# Patient Record
Sex: FEMALE | Race: AMERICAN INDIAN OR ALASKA NATIVE
[De-identification: names, ages, dates, MRNs, and addresses within clinical notes are randomized per-mention and may not be internally consistent; named-entity substitution may affect disease eponyms.]

---

## 2020-01-14 ENCOUNTER — HOSPITAL ENCOUNTER (OUTPATIENT)
Dept: HOSPITAL 5 - ED | Age: 55
Setting detail: OBSERVATION
LOS: 1 days | Discharge: HOME | End: 2020-01-15
Attending: INTERNAL MEDICINE | Admitting: INTERNAL MEDICINE
Payer: MEDICARE

## 2020-01-14 DIAGNOSIS — I25.10: ICD-10-CM

## 2020-01-14 DIAGNOSIS — J44.9: ICD-10-CM

## 2020-01-14 DIAGNOSIS — R07.89: Primary | ICD-10-CM

## 2020-01-14 DIAGNOSIS — Z88.0: ICD-10-CM

## 2020-01-14 DIAGNOSIS — Z88.8: ICD-10-CM

## 2020-01-14 DIAGNOSIS — Z90.710: ICD-10-CM

## 2020-01-14 DIAGNOSIS — Z79.82: ICD-10-CM

## 2020-01-14 DIAGNOSIS — Z79.51: ICD-10-CM

## 2020-01-14 DIAGNOSIS — Z87.891: ICD-10-CM

## 2020-01-14 DIAGNOSIS — I25.2: ICD-10-CM

## 2020-01-14 DIAGNOSIS — Z79.899: ICD-10-CM

## 2020-01-14 DIAGNOSIS — I10: ICD-10-CM

## 2020-01-14 DIAGNOSIS — Z88.1: ICD-10-CM

## 2020-01-14 DIAGNOSIS — Z91.018: ICD-10-CM

## 2020-01-14 LAB
BASOPHILS # (AUTO): 0.1 K/MM3 (ref 0–0.1)
BASOPHILS NFR BLD AUTO: 0.8 % (ref 0–1.8)
BUN SERPL-MCNC: 11 MG/DL (ref 7–17)
BUN/CREAT SERPL: 14 %
CALCIUM SERPL-MCNC: 9.6 MG/DL (ref 8.4–10.2)
EOSINOPHIL # BLD AUTO: 0.1 K/MM3 (ref 0–0.4)
EOSINOPHIL NFR BLD AUTO: 1.1 % (ref 0–4.3)
HCT VFR BLD CALC: 37.6 % (ref 30.3–42.9)
HEMOLYSIS INDEX: 41
HGB BLD-MCNC: 11.9 GM/DL (ref 10.1–14.3)
LYMPHOCYTES # BLD AUTO: 2.5 K/MM3 (ref 1.2–5.4)
LYMPHOCYTES NFR BLD AUTO: 26.8 % (ref 13.4–35)
MCHC RBC AUTO-ENTMCNC: 32 % (ref 30–34)
MCV RBC AUTO: 89 FL (ref 79–97)
MONOCYTES # (AUTO): 0.6 K/MM3 (ref 0–0.8)
MONOCYTES % (AUTO): 6.5 % (ref 0–7.3)
PLATELET # BLD: 384 K/MM3 (ref 140–440)
RBC # BLD AUTO: 4.2 M/MM3 (ref 3.65–5.03)

## 2020-01-14 PROCEDURE — 96375 TX/PRO/DX INJ NEW DRUG ADDON: CPT

## 2020-01-14 PROCEDURE — A9502 TC99M TETROFOSMIN: HCPCS

## 2020-01-14 PROCEDURE — 96374 THER/PROPH/DIAG INJ IV PUSH: CPT

## 2020-01-14 PROCEDURE — 80048 BASIC METABOLIC PNL TOTAL CA: CPT

## 2020-01-14 PROCEDURE — G0378 HOSPITAL OBSERVATION PER HR: HCPCS

## 2020-01-14 PROCEDURE — 82553 CREATINE MB FRACTION: CPT

## 2020-01-14 PROCEDURE — 71045 X-RAY EXAM CHEST 1 VIEW: CPT

## 2020-01-14 PROCEDURE — 85025 COMPLETE CBC W/AUTO DIFF WBC: CPT

## 2020-01-14 PROCEDURE — 93010 ELECTROCARDIOGRAM REPORT: CPT

## 2020-01-14 PROCEDURE — 78452 HT MUSCLE IMAGE SPECT MULT: CPT

## 2020-01-14 PROCEDURE — 93017 CV STRESS TEST TRACING ONLY: CPT

## 2020-01-14 PROCEDURE — 99284 EMERGENCY DEPT VISIT MOD MDM: CPT

## 2020-01-14 PROCEDURE — 82550 ASSAY OF CK (CPK): CPT

## 2020-01-14 PROCEDURE — 93005 ELECTROCARDIOGRAM TRACING: CPT

## 2020-01-14 PROCEDURE — 84484 ASSAY OF TROPONIN QUANT: CPT

## 2020-01-14 PROCEDURE — 36415 COLL VENOUS BLD VENIPUNCTURE: CPT

## 2020-01-14 NOTE — XRAY REPORT
CHEST 1 VIEW 



INDICATION / CLINICAL INFORMATION:

Chest pain.



COMPARISON: 

8/11/14



FINDINGS:



SUPPORT DEVICES: None.

HEART / MEDIASTINUM: No significant abnormality. 

LUNGS / PLEURA: No significant pulmonary or pleural abnormality..  No pneumothorax. 



ADDITIONAL FINDINGS: No significant additional findings.



IMPRESSION:

1. No acute findings.



Signer Name: Steve Allred MD 

Signed: 1/14/2020 7:29 PM

 Workstation Name: [id number]

## 2020-01-15 VITALS — SYSTOLIC BLOOD PRESSURE: 112 MMHG | DIASTOLIC BLOOD PRESSURE: 76 MMHG

## 2020-01-15 LAB
BASOPHILS # (AUTO): 0 K/MM3 (ref 0–0.1)
BASOPHILS NFR BLD AUTO: 0.2 % (ref 0–1.8)
BUN SERPL-MCNC: 13 MG/DL (ref 7–17)
BUN/CREAT SERPL: 16 %
CALCIUM SERPL-MCNC: 8.8 MG/DL (ref 8.4–10.2)
EOSINOPHIL # BLD AUTO: 0.1 K/MM3 (ref 0–0.4)
EOSINOPHIL NFR BLD AUTO: 1.1 % (ref 0–4.3)
HCT VFR BLD CALC: 35.4 % (ref 30.3–42.9)
HEMOLYSIS INDEX: 0
HGB BLD-MCNC: 11.9 GM/DL (ref 10.1–14.3)
LYMPHOCYTES # BLD AUTO: 2.3 K/MM3 (ref 1.2–5.4)
LYMPHOCYTES NFR BLD AUTO: 36 % (ref 13.4–35)
MCHC RBC AUTO-ENTMCNC: 34 % (ref 30–34)
MCV RBC AUTO: 88 FL (ref 79–97)
MONOCYTES # (AUTO): 0.5 K/MM3 (ref 0–0.8)
MONOCYTES % (AUTO): 7.7 % (ref 0–7.3)
PLATELET # BLD: 363 K/MM3 (ref 140–440)
RBC # BLD AUTO: 4.01 M/MM3 (ref 3.65–5.03)

## 2020-01-15 RX ADMIN — OXYCODONE AND ACETAMINOPHEN PRN TAB: 5; 325 TABLET ORAL at 12:40

## 2020-01-15 RX ADMIN — OXYCODONE AND ACETAMINOPHEN PRN TAB: 5; 325 TABLET ORAL at 00:29

## 2020-01-15 NOTE — TREADMILL REPORT
NUCLEAR STRESS TEST



The patient is brought to the Cardiology lab and a nuclear stress test is

performed by administering dobutamine by protocol.  Post-stress images reveal

homogeneous distribution of the isotope with no significant reversible defects

to indicate ischemia.  Accompanying gated study shows excellent left ventricular

systolic function with ejection fraction of 80%.



IMPRESSION:

1.  This dobutamine stress test is noted to be negative for reversible defects

to indicate ischemia.

2.  Excellent systolic function with ejection fraction of 80%.

3.  Suggest clinical correlation.





DD: 01/15/2020 11:07

DT: 01/15/2020 21:17

JOB# [id number]  [id number]

[facility]

## 2020-01-15 NOTE — DISCHARGE SUMMARY
Providers





- Providers


Date of Admission: 


01/14/20 22:15





Date of discharge: 01/15/20


Attending physician: 


KAMRAN CHAPMAN





Primary care physician: 


[facility]IMARY CARE MD








Hospitalization


Condition: Stable


Hospital course: 


Patient is a 58-year-old woman with a history of hypertension, COPD, irritable 

bowel syndrome, coronary artery disease comes emergency room with complaints of 

chest pain for more than 2 days ago.  Patient states she has a history of 

coronary artery disease, diagnosed in 2014, no intervention was done after the 

stress test, she was placed on statin and aspirin. Pain is in the epigastric 

area which she described as a elephant sitting on her chest, constant, intensity

5/10,  radiating to the left arm, worse with movement, worse with morphine given

in the emergency room.  She stated that today she got dizzy with chest pain.  

Admits to nausea, shortness of breath, no diaphoresis, palpitation





Chest pain, costochrondritis stable


Check cardiac enzymes, check stress test


start aspirin, percocet


Continue outpatient medication





Hypertension


Continue outpatient medications





COPD, stable





DVT prophylaxis





Disposition: DC-01 TO HOME OR SELFCARE


Time spent for discharge: 31 minutes





Core Measure Documentation





- Palliative Care


Palliative Care/ Comfort Measures: Not Applicable





- Core Measures


Any of the following diagnoses?: none





- VTE Discharge Requirements


Deep Vein Thrombosis/Pulmonary Embolism Present on Admission: No


Has pt received <5 days of overlap therapy or INR<2.0: No


Anticoagulant overlap therapy prescribed at discharge: No


Contraindication No Overlap Therapy order at DC: Not Indicated





Exam





- Physical Exam


Narrative exam: 


GEN: WDWN, NAD, Awake, Alert, Orientated 


HEENT: NCAT, EOMI, PERRL, OP Clear 


NECK: supple, no adenopathy, no thyromegaly, no JVD 


CVS/HEART: RRR, normal S1S2, pulses present bilaterally 


CHEST/LUNGS: CTA B, Symmetrical chest expansion, good air entry bilaterally, 

reproducible CW tenderness


GI/Abdomen: soft, NTND, good bowel sounds, no guarding or rebound 


/Bladder: no suprapubic tenderness, no CVA or paraspinal tenderness 


EXT/Skin: no c/c/e, no obvious rash 


MSK: FROM x 4 


Neuro: CN 2-12 grossly intact, no new focal deficits 


Psych: calm 








- Constitutional


Vitals: 


                                        











Temp Pulse Resp BP Pulse Ox


 


 98.0 F   52 L  20   95/64   100 


 


 01/15/20 04:54  01/15/20 08:06  01/15/20 12:40  01/15/20 04:54  01/15/20 04:54














Plan


Activity: other (no strenous activity unless cleared by PCP)


Diet: low salt


Follow up with: 


[facility]IMARY [facility]ARE,MD [Primary Care Provider] - 7 Days


[facility] [Other[facility] - 7 Days


Prescriptions: 


oxyCODONE /ACETAMINOPHEN [Percocet 5/325 mg] 1 tab PO Q6H PRN #15 tablet


 PRN Reason: Pain, Moderate (4-6)

## 2020-01-20 ENCOUNTER — HOSPITAL ENCOUNTER (EMERGENCY)
Dept: HOSPITAL 5 - ED | Age: 55
Discharge: HOME | End: 2020-01-20
Payer: MEDICARE

## 2020-01-20 VITALS — DIASTOLIC BLOOD PRESSURE: 86 MMHG | SYSTOLIC BLOOD PRESSURE: 123 MMHG

## 2020-01-20 DIAGNOSIS — R39.11: ICD-10-CM

## 2020-01-20 DIAGNOSIS — Z88.0: ICD-10-CM

## 2020-01-20 DIAGNOSIS — R07.89: Primary | ICD-10-CM

## 2020-01-20 DIAGNOSIS — I10: ICD-10-CM

## 2020-01-20 DIAGNOSIS — I25.2: ICD-10-CM

## 2020-01-20 DIAGNOSIS — Z90.710: ICD-10-CM

## 2020-01-20 DIAGNOSIS — Z79.899: ICD-10-CM

## 2020-01-20 DIAGNOSIS — Z87.19: ICD-10-CM

## 2020-01-20 DIAGNOSIS — Z90.49: ICD-10-CM

## 2020-01-20 DIAGNOSIS — J44.9: ICD-10-CM

## 2020-01-20 DIAGNOSIS — Z88.8: ICD-10-CM

## 2020-01-20 DIAGNOSIS — R91.1: ICD-10-CM

## 2020-01-20 DIAGNOSIS — K58.9: ICD-10-CM

## 2020-01-20 LAB
ALBUMIN SERPL-MCNC: 4 G/DL (ref 3.9–5)
ALT SERPL-CCNC: 139 UNITS/L (ref 7–56)
BUN SERPL-MCNC: 8 MG/DL (ref 7–17)
BUN/CREAT SERPL: 10 %
CALCIUM SERPL-MCNC: 10.5 MG/DL (ref 8.4–10.2)
HCT VFR BLD CALC: 39.3 % (ref 30.3–42.9)
HEMOLYSIS INDEX: 11
HGB BLD-MCNC: 13 GM/DL (ref 10.1–14.3)
INR PPP: 1.01 (ref 0.87–1.13)
MCHC RBC AUTO-ENTMCNC: 33 % (ref 30–34)
MCV RBC AUTO: 87 FL (ref 79–97)
PLATELET # BLD: 426 K/MM3 (ref 140–440)
RBC # BLD AUTO: 4.5 M/MM3 (ref 3.65–5.03)

## 2020-01-20 PROCEDURE — 74022 RADEX COMPL AQT ABD SERIES: CPT

## 2020-01-20 PROCEDURE — 80053 COMPREHEN METABOLIC PANEL: CPT

## 2020-01-20 PROCEDURE — 93010 ELECTROCARDIOGRAM REPORT: CPT

## 2020-01-20 PROCEDURE — 36415 COLL VENOUS BLD VENIPUNCTURE: CPT

## 2020-01-20 PROCEDURE — 85027 COMPLETE CBC AUTOMATED: CPT

## 2020-01-20 PROCEDURE — 85379 FIBRIN DEGRADATION QUANT: CPT

## 2020-01-20 PROCEDURE — 99285 EMERGENCY DEPT VISIT HI MDM: CPT

## 2020-01-20 PROCEDURE — 83735 ASSAY OF MAGNESIUM: CPT

## 2020-01-20 PROCEDURE — 84484 ASSAY OF TROPONIN QUANT: CPT

## 2020-01-20 PROCEDURE — 85610 PROTHROMBIN TIME: CPT

## 2020-01-20 PROCEDURE — 74177 CT ABD & PELVIS W/CONTRAST: CPT

## 2020-01-20 PROCEDURE — 93005 ELECTROCARDIOGRAM TRACING: CPT

## 2020-01-20 PROCEDURE — 71275 CT ANGIOGRAPHY CHEST: CPT

## 2020-01-20 NOTE — EMERGENCY DEPARTMENT REPORT
<RYLEE VAUGHN - Last Filed: 20 14:12>





ED General Adult HPI





- General


Chief complaint: Abdominal Pain


Stated complaint: LOWER ABD PAIN/RECTUM PAIN


Time Seen by Provider: 20 11:59


Source: patient, EMS (EMS records not available at this time for chart dictation

or review.), RN notes reviewed, old records reviewed


Mode of arrival: Stretcher


Limitations: No Limitations





- History of Present Illness


Initial comments: 





During the entire history and physical examination, I am chaperone and escorted 

by  nurse Brittney Gaspar








Primary care DrBuddy: Khris





Past medical history: Hypertension, COPD, IBS, question CAD, had a negative 

nuclear stress test this month











Patient is a 54-year-old female who was not known to myself previously.  She 

presents with multiple complaints today.  She presents with central and left-s

ided chest wall pain and breast pain, present intermittently for over a week.  

The pain does not radiate to the back, arms or neck.  There is no vomiting or 

diaphoresis or exertional shortness of breath.





The patient indicates the pain worsens when she gets up and decreases with rest.





The patient's next complaint is urinary hesitancy, straining to urinate, and 

feeling like she has a mass in her vagina that's preventing her from urinating. 

She also endorses brown stool mixed with red blood, for one day, straining to 

defecate.  She denies dysuria and hematuria.  She is not taking any blood thi

nning medicines.








There is no complaint of headache, neck pain, she has no upper abdominal pain, 

no muscular skeletal extremity complaints are endorses


-: Gradual


Location: chest


Severity scale (0 -10): 8


Quality: aching


Consistency: other


Improves with: other


Worsens with: other


Associated Symptoms: other





- Related Data


                                Home Medications











 Medication  Instructions  Recorded  Confirmed  Last Taken


 


ALBUTEROL NEB's [Proventil 0.083% 2.5 mg IH QID 20 Unknown





NEBS]    


 


Albuterol Sulfate [Proair 90 mcg IH QID MDD 2 PUFFS 20 Unknown





Digihaler]    


 


Aspirin EC [Halfprin EC] 81 mg PO QDAY 20 Unknown


 


Budesonide/Formoterol Fumarate 10.2 gm IH BID MDD 2 PUFFS 20 

Unknown





[Symbicort 160-4.5 Mcg Inhaler]    


 


Dicyclomine [Bentyl] 10 mg PO BID MDD 2 CAPSULES 20 Unknown


 


EPINEPHrine [Epipen] 0.3 mg IJ PRN 01/14/20 01/15/20 Unknown


 


Fluticasone [Flonase] 2 spray NS BID 20 Unknown


 


Ipratropium [Atrovent NEB] 0.5 mg IH Q4HR 20 Unknown


 


Loratadine [Claritin] 10 mg PO QDAY 20 Unknown


 


Rosuvastatin Calcium [Crestor] 20 mg PO QDAY 20 Unknown


 


Sennosides [Senna] 8.6 mg PO QHS MDD 2 TABLETS 20 Unknown


 


Tiotropium Bromide [Spiriva] 18 mcg IH QDAY MDD 2 20 Unknown


 


Triamcinolone 0.1% [Kenalog 0.1% 1 applic TP BID 20 Unknown





CREAM]    


 


carvediloL [Coreg] 3.125 mg PO BID 20 Unknown








                                  Previous Rx's











 Medication  Instructions  Recorded  Last Taken  Type


 


Acetaminophen [Acetaminophen TAB] 2 tab PO Q4H PRN #15 tablet 01/15/20 Unknown 

Rx


 


oxyCODONE /ACETAMINOPHEN [Percocet 1 tab PO Q6H PRN #15 tablet 01/15/20 Unknown 

Rx





5/325 mg]    











                                    Allergies











Allergy/AdvReac Type Severity Reaction Status Date / Time


 


amlodipine [From Norvasc] Allergy  Unknown Verified 20 19:14


 


clarithromycin [From Biaxin] Allergy  Rash Verified 13 10:45


 


clonidine Allergy  Unknown Verified 20 19:14


 


grape Allergy  Unknown Verified 01/15/20 03:24


 


hydralazine [Hydralazine] Allergy  Unknown Verified 13 10:45


 


hydrochlorothiazide Allergy  Unknown Verified 20 19:14


 


lisinopril Allergy  Unknown Verified 20 19:14


 


losartan Allergy  Unknown Verified 20 19:14


 


Penicillins Allergy  Unknown Verified 13 10:45


 


regadenoson [From Lexiscan] Allergy  Unknown Verified 01/15/20 03:00


 


apple Allergy  Unknown Uncoded 01/15/20 03:24


 


peach Allergy  Unknown Uncoded 01/15/20 03:24


 


strawberry Allergy  Unknown Uncoded 01/15/20 03:24














ED Review of Systems


Constitutional: denies: fever


Eyes: denies: eye discharge


ENT: denies: congestion


Respiratory: denies: wheezing


Cardiovascular: chest pain


Gastrointestinal: hematochezia


Genitourinary: other


Musculoskeletal: denies: back pain


Skin: denies: lesions


Psychiatric: anxiety


Hematological/Lymphatic: denies: easy bleeding





ED Past Medical Hx





- Past Medical History


Hx Hypertension: Yes


Hx Heart Attack/AMI: Yes (MI in )


Hx Congestive Heart Failure: No


Hx Diabetes: No


Hx Deep Vein Thrombosis: No


Hx Pulmonary Embolism: No


Hx Asthma: No


Hx COPD: Yes


Hx Tuberculosis: No


Additional medical history: Irritable Bowel syndrome





- Surgical History


Hx Coronary Stent: No


Hx Pacemaker: No


Hx Internal Defibrillator: No


Hx Cholecystectomy: Yes


Additional Surgical History: HYSTERECTOMY





- Social History


Smoking Status: Never Smoker


Substance Use Type: None





- Medications


Home Medications: 


                                Home Medications











 Medication  Instructions  Recorded  Confirmed  Last Taken  Type


 


ALBUTEROL NEB's [Proventil 0.083% 2.5 mg IH QID 20 Unknown 

History





NEBS]     


 


Albuterol Sulfate [Proair 90 mcg IH QID MDD 2 PUFFS 20 Unknown 

History





Digihaler]     


 


Aspirin EC [Halfprin EC] 81 mg PO QDAY 20 Unknown History


 


Budesonide/Formoterol Fumarate 10.2 gm IH BID MDD 2 PUFFS 20 

Unknown History





[Symbicort 160-4.5 Mcg Inhaler]     


 


Dicyclomine [Bentyl] 10 mg PO BID MDD 2 CAPSULES 20 Unknown 

History


 


EPINEPHrine [Epipen] 0.3 mg IJ PRN 01/14/20 01/15/20 Unknown History


 


Fluticasone [Flonase] 2 spray NS BID 20 Unknown History


 


Ipratropium [Atrovent NEB] 0.5 mg IH Q4HR 20 Unknown History


 


Loratadine [Claritin] 10 mg PO QDAY 20 Unknown History


 


Rosuvastatin Calcium [Crestor] 20 mg PO QDAY 20 Unknown History


 


Sennosides [Senna] 8.6 mg PO QHS MDD 2 TABLETS 20 Unknown History


 


Tiotropium Bromide [Spiriva] 18 mcg IH QDAY MDD 2 20 Unknown 

History


 


Triamcinolone 0.1% [Kenalog 0.1% 1 applic TP BID 20 Unknown 

History





CREAM]     


 


carvediloL [Coreg] 3.125 mg PO BID 20 Unknown History


 


Acetaminophen [Acetaminophen TAB] 2 tab PO Q4H PRN #15 tablet 01/15/20  Unknown 

Rx


 


oxyCODONE /ACETAMINOPHEN [Percocet 1 tab PO Q6H PRN #15 tablet 01/15/20  Unknown

 Rx





5/325 mg]     














ED Physical Exam





- General


Limitations: No Limitations


General appearance: alert, anxious





- Head


Head exam: Present: atraumatic, normocephalic





- Eye


Eye exam: Present: normal appearance, EOMI.  Absent: nystagmus





- ENT


ENT exam: Present: normal exam, normal orophraynx, mucous membranes moist, 

normal external ear exam





- Neck


Neck exam: Present: normal inspection, full ROM.  Absent: tenderness, 

meningismus





- Respiratory


Respiratory exam: Present: normal lung sounds bilaterally, chest wall 

tenderness, other (there is no breast redness, pus, tenderness or streaking.  

There is reproducible central chest wall pain and left-sided breast pain.  No 

breast masses are appreciated.  Chaperoned by nurse brittney mittal).  Absent:

respiratory distress, wheezes, rales, rhonchi, stridor





- Cardiovascular


Cardiovascular Exam: Present: regular rate, normal rhythm, normal heart sounds. 

Absent: bradycardia, tachycardia, irregular rhythm, systolic murmur, diastolic 

murmur, rubs, gallop





- GI/Abdominal


GI/Abdominal exam: Present: soft.  Absent: distended, tenderness, guarding, re

bound, rigid, pulsatile mass





- Rectal


Rectal exam: Present: normal inspection, hemorrhoids, other (external 

hemorrhoids noted.  No obvious bleeding noted.  Chaperoned by nurse Brittney Gaspar).  Absent: tenderness





- 


External exam: Present: normal external exam.  Absent: bleeding


Speculum exam: Present: tissue (nontender tissue noted anteriorly, around the 

urethra, suspicious for urethral  prolapse).  Absent: vaginal bleeding





- Extremities Exam


Extremities exam: Present: normal inspection, full ROM, other (2+ pulses noted 

in the bilateral upper and lower extremities.  There is no long bony tenderness.

 The muscular compartments are soft.  The pelvis is stable.).  Absent: pedal 

edema, calf tenderness





- Back Exam


Back exam: Present: normal inspection, full ROM.  Absent: paraspinal tenderness,

vertebral tenderness





- Neurological Exam


Neurological exam: Present: alert, other (2+ pulses noted in the bilateral upper

and lower extremities.  There is no long bony tenderness.  The muscular 

compartments are soft.  The pelvis is stable.).  Absent: motor sensory deficit





- Psychiatric


Psychiatric exam: Present: anxious





- Skin


Skin exam: Present: warm, dry, intact, normal color.  Absent: rash





ED Course





- Reevaluation(s)


Reevaluation #1: 





20 13:30


Differential diagnosis, including but not limited to: GERD, gastritis, co

stochondritis, pneumonia, pulmonary embolism, acute coronary syndrome, 

constipation, internal hemorrhoids, external hemorrhoids, urethral prolapse, 

pelvic floor dysfunction





Assessment and plan: 54-year-old female with multiple complaints





Complaints #1, chest pain, reproducible, had a negative nuclear stress test this

month, EKG unchanged from prior, not tachycardic, tachypneic or hypoxic.  Patie

nt unlikely to experience major adverse cardiac event, she is low risk by the 

heart score.  Given persistence of pain, recent hospitalization, d-dimer is 

ordered, found to be elevated, CT scan of the chest is ordered.  However, the 

patient is not tachycardic, tachypneic or hypoxic, therefore, a pulmonary 

embolism is unlikely.  We will treat her pain supportively and symptomatically.











Complaints #2, constipation, brown stool with red blood.  No blood noted on 

rectal examination.  Hemoglobin, hematocrit unremarkable.  Question external 

hemorrhoid.  Complaints #3, urinary hesitancy, possible urethral prolapse on 

examination.  Treat supportively and symptomatically, obtain imaging studies to 

exclude acute surgical process, reassess.


Reevaluation #2: 





20 14:02


Laboratory studies reviewed and appreciated.  Patient is status post 

cholecystectomy.  There is no right upper quadrant pain or tenderness.  She can 

follow up with an outpatient primary care doctor for nonspecific elevation in 

LFTs.  Resting comfortably and in no acute distress at this time


Reevaluation #3: 





20 14:14


care will be transferred to Dr FRANKLIN Jaime to follow up on repeat ekg, ct angio ct 

abd/pelvis

















ED Medical Decision Making





- Lab Data


Result diagrams: 


                                 20 12:30





                                 20 12:30








                                   Vital Signs











  20





  11:54 11:56


 


Temperature 97.9 F 97.9 F


 


Pulse Rate 60 60


 


Respiratory 20 20





Rate  


 


Blood Pressure  132/85


 


Blood Pressure 132/85 





[Left]  


 


O2 Sat by Pulse 100 100





Oximetry  











                                   Lab Results











  20 Range/Units





  12:30 12:30 12:30 


 


WBC  8.9    (4.5-11.0)  K/mm3


 


RBC  4.50    (3.65-5.03)  M/mm3


 


Hgb  13.0    (10.1-14.3)  gm/dl


 


Hct  39.3    (30.3-42.9)  %


 


MCV  87    (79-97)  fl


 


MCH  29    (28-32)  pg


 


MCHC  33    (30-34)  %


 


RDW  16.0 H    (13.2-15.2)  %


 


Plt Count  426    (140-440)  K/mm3


 


PT   13.4   (12.2-14.9)  Sec.


 


INR   1.01   (0.87-1.13)  


 


D-Dimer   714.66 H   (0-234)  ng/mlDDU


 


Sodium    144  (137-145)  mmol/L


 


Potassium    4.0  (3.6-5.0)  mmol/L


 


Chloride    107.1 H  ()  mmol/L


 


Carbon Dioxide    22  (22-30)  mmol/L


 


Anion Gap    19  mmol/L


 


BUN    8  (7-17)  mg/dL


 


Creatinine    0.8  (0.7-1.2)  mg/dL


 


Estimated GFR    > 60  ml/min


 


BUN/Creatinine Ratio    10  %


 


Glucose    110 H  ()  mg/dL


 


Calcium    10.5 H  (8.4-10.2)  mg/dL


 


Magnesium    2.00  (1.7-2.3)  mg/dL


 


Total Bilirubin    0.30  (0.1-1.2)  mg/dL


 


AST    41 H  (5-40)  units/L


 


ALT    139 H  (7-56)  units/L


 


Alkaline Phosphatase    117  ()  units/L


 


Troponin T    < 0.010  (0.00-0.029)  ng/mL


 


Total Protein    7.6  (6.3-8.2)  g/dL


 


Albumin    4.0  (3.9-5)  g/dL


 


Albumin/Globulin Ratio    1.1  %














- EKG Data


-: EKG Interpreted by Me





- Radiology Data


Radiology results: report reviewed, image reviewed


interpreted by me: 





X-ray the chest is negative for acute disease, mediastinum appears to be 

exaggerated secondary to portable technique





X-ray of the abdomen and pelvis shows no acute bony disease, constipation, 

nonspecific bowel gas pattern, pelvic phleboliths noted





int Report


Referring Physician:   RYLEE VAUGHN


Patient Name:   KERRI ODELL


Patient ID:   [id number]


YOB: 1965


Sex:   Female


Accession:   [id number]


Report Date:   2020


Report Status:   Finalized


Findings


[facility]


[address] [place] [address]





XRay Report


Signed





Patient: KERRI ODELL MR#: M


[id number]


: 1965 Acct:[id number]





Age/Sex: 54 / F ADM Date: 20





Loc: ED


Attending Dr:








Ordering Physician: RYLEE VAUGHN MD


Date of Service: 20


Procedure(s): XR abd series w cxr 1V


Accession Number(s): [id number]





cc: RYLEE VAUGHN MD





Fluoro Time In Minutes:





PROCEDURE: ABDOMEN FLAT AND UPRIGHT WITH SINGLE VIEW CHEST





HISTORY: cp abd pressure





COMPARISON: None.





TECHNIQUE: Supine and upright abdominal as well as single view chest radiographs

 obtained.





FINDINGS:


Lungs: The lungs are clear. The lung volumes are normal.


Pleural Effusion: No evidence of a pleural effusion is seen.


Pneumothorax: No evidence of pneumothorax is seen.


Cardiac: The heart size is normal.


Mediastinal silhouette: The mediastinal silhouette is normal.


Hilar regions: The hilar regions are normal in appearance.


Pulmonary vascularity: The pulmonary vascularity is normal in appearance.


Trachea: The trachea is midline.


Skeletal structures: No suspicious bone lesion. Support hardware: None.





Additional findings: Right upper quadrant surgical clips.





Bowel: The bowel gas pattern is normal in appearance. No evidence of obstruction

 is seen. No


evidence of free air is identified.


Calcifications: No abnormal calcifications over the kidneys or along the course 

of the ureters are


seen. No phleboliths in the pelvis are seen.


Osseous Structures: No suspicious bone lesion.





Additional findings: Phleboliths in the pelvis.





IMPRESSION: Negative abdomen and chest exams. Status post cholecystectomy.





Signer Name: Rah Abrams MD


Signed: 2020 1:35 PM


Workstation Name: [id number]








Transcribed By: REF


Dictated By: RAH ABRAMS MD


Electronically Authenticated By: RAH ABRAMS MD


Signed Date/Time: 20











DD/DT: 20 1333





ED Disposition


Clinical Impression: 


 Chest wall pain, Urinary hesitancy, History of gastrointestinal bleeding, 

Incidental lung nodule, > 3mm and < 8mm





Disposition: - TO HOME OR SELFCARE


Condition: Stable


Instructions:  Chest Pain (ED), Abdominal Pain (ED), Pulmonary Nodules (ED)


Additional Instructions: 


Patient may continue outpatient medications.  Rest, avoid heavy lifting, and 

avoid strenuous physical activities.  If patient takes metformin medication, she

 should not take this medication for the next few days.





Avoid consumption of heavy and/or spicy foods.  Take Motrin, ibuprofen as needed

 for pain.  Recommend that patient drinks 4-6 cups of water per day for the 

foreseeable future, and eat plenty of fiber, fruits, vegetables.





Recommend patient follow up with the primary care doctor for chest wall pain or 

a cardiologist for chest wall pain within the next 7 days.





Recommend patient follow-up with an outpatient gastroenterologist within the 

next 4-6 weeks for history of blood in stool.








Recommend patient follow up with outpatient urologist within the next 4-6 weeks 

if she persists in having difficulty with urination, and persistently has 

sensation of genital mass.








Patient may have urethral prolapse, likely secondary to constipation and 

straining.  Patient may also have pelvic floor dysfunction, however, these 

conditions are typically not dangerous or life-threatening.





By eating plenty of fiber, vegetables, lean protein, and drinking plenty of 

water, patient's improve her symptoms.  In addition, patient should practice 

[place] exercises as often as is possible; patient can look this up online or 

google it











Please return to the emergency room right away with new, worsening, different 

symptoms, or symptoms not present on the initial emergency room evaluation.


Referrals: 


[facility] [Other] - 3-5 Days


[facility] [Provider Group] - 3-5 Days


[facility] [Provider Group] - as needed


[facility] [Provider Group] - as needed





<TEMO JAIME - Last Filed: 20 18:28>





ED Review of Systems


ROS: 


Stated complaint: LOWER ABD PAIN/RECTUM PAIN


Other details as noted in HPI








ED Course


                                   Vital Signs











  20





  11:54 11:56


 


Temperature 97.9 F 97.9 F


 


Pulse Rate 60 60


 


Respiratory 20 20





Rate  


 


Blood Pressure  132/85


 


Blood Pressure 132/85 





[Left]  


 


O2 Sat by Pulse 100 100





Oximetry  














ED Medical Decision Making





- Lab Data


Result diagrams: 


                                 20 12:30





                                 20 12:30





- Radiology Data





CT angiography of the chest with 2-D reconstructions





INDICATION: Chest pain with elevated d-dimer





Thin section axial images were obtained as well as 2-D reformatted MIP images in

 all 3 planes





FINDINGS: There is no hilar or mediastinal adenopathy. No pleural or pericardial

 effusion. Lung


windows show no masses or infiltrates. There is no thoracic aortic aneurysm or 

dissection present.


Routine axial images as well as 2-D reconstructions through the pulmonary 

arteries show no evidence


of emboli. There is an incidental 6 mm right lower lobe nodule on image #262 of 

series #3.





IMPRESSION: No PTE or pneumonia. Incidental right lower lobe nodule.





INCIDENTAL PULMONARY NODULE RECOMMENDATIONS





Solid Nodule size 6-8 mm -- Single


- Low Risk Patient: CT at 6-12 months, then consider CT at 18-24 months


- High Risk Patient: CT at 6-12 months, then CT at 18-24 months








Note These recommendations do not apply to lung cancer screening, patients with 

immunosuppression,


or patients with known primary cancer.





Note Newly detected indeterminate nodule in persons 35 years of age or older. 

Persons under the


age of 35 should not receive follow-up unless there is a known primary cancer.





Low Risk Patient -- minimal or absent history of smoking and of other known risk

 factors.


High Risk Patient -- history of smoking or of other known risk factors.





Nodule dimensions are average of long and short axes, rounded to the nearest 

millimeter.





Based on 2017 Fleischner Society Guidelines found in Radiology 2017 284:228-243.


https://doi.org/10.1148/radiol.0541702102





CT of the abdomen and pelvis with contrast





FINDINGS: Gallbladder is been removed. No biliary tree dilation. The liver, 

spleen, pancreas and


adrenal glands show no definite abnormalities. Right kidney appears normal. Left

 renal low densities


are probably cysts. No fluid or adenopathy in the upper abdomen.





CT of the pelvis shows evidence of hysterectomy. There is slight presacral edema

 of uncertain


etiology as the rectum and sigmoid colon show no definite abnormalities. No p

elvic or inguinal


adenopathy. No adnexal masses. No hernia or bowel obstruction. No diverticulosis

 or diverticulitis.


Appendix is not seen.





IMPRESSION: No significant abnormality.





- Medical Decision Making





pt roz be d/nick as per Dr Vaughn plan


No acute findings on imaging studies.


Patient will be discharged with a copy of her CT report regarding incidental 

lung nodule findings and need for outpatient surveillance.


pt informed of her lung nodule and states she follows with a lung specialist who

 monitors this nodule. She will take copy of ct to her doctors for further 

management. 


Critical care attestation.: 


If time is entered above; I have spent that time in minutes in the direct care 

of this critically ill patient, excluding procedure time.








ED Disposition


Is pt being admited?: No


Time of Disposition: 18:20

## 2020-01-20 NOTE — XRAY REPORT
PROCEDURE: ABDOMEN FLAT AND UPRIGHT WITH SINGLE VIEW CHEST



HISTORY: cp abd pressure



COMPARISON: None. 



TECHNIQUE: Supine and upright abdominal as well as single view chest radiographs obtained.



FINDINGS:  

Lungs: The lungs are clear. The lung volumes are normal.

Pleural Effusion: No evidence of a pleural effusion is seen.

Pneumothorax:  No evidence of pneumothorax is seen.

Cardiac: The heart size is normal.  

Mediastinal silhouette: The mediastinal silhouette is normal.

Hilar regions: The hilar regions are normal in appearance.

Pulmonary vascularity: The pulmonary vascularity is normal in appearance.

Trachea: The trachea is midline.

Skeletal structures: No suspicious bone lesion. Support hardware: None.



Additional findings: Right upper quadrant surgical clips.



Bowel: The bowel gas pattern is normal in appearance. No evidence of obstruction is seen. No evidence
 of free air is identified.

Calcifications: No abnormal calcifications over the kidneys or along the course of the ureters are se
en.  No phleboliths in the pelvis are seen.

Osseous Structures: No suspicious bone lesion.    



Additional findings: Phleboliths in the pelvis.



IMPRESSION:  Negative abdomen and chest exams. Status post cholecystectomy.



Signer Name: Julio Cesar Watt MD 

Signed: 1/20/2020 1:35 PM

 Workstation Name: [id number]

## 2020-01-20 NOTE — CAT SCAN REPORT
CT angiography of the chest with 2-D reconstructions



INDICATION: Chest pain with elevated d-dimer



Thin section axial images were obtained as well as 2-D reformatted MIP images in all 3 planes



FINDINGS: There is no hilar or mediastinal adenopathy. No pleural or pericardial effusion. Lung windo
ws show no masses or infiltrates. There is no thoracic aortic aneurysm or dissection present. Routine
 axial images as well as 2-D reconstructions through the pulmonary arteries show no evidence of embol
i. There is an incidental 6 mm right lower lobe nodule on image #262 of series #3.



IMPRESSION: No PTE or pneumonia. Incidental right lower lobe nodule.



INCIDENTAL PULMONARY NODULE RECOMMENDATIONS 



Solid Nodule size 6-8 mm -- Single

- Low Risk Patient: CT at 6-12 months, then consider CT at 18-24 months

- High Risk Patient: CT at 6-12 months, then CT at 18-24 months





Note  These recommendations do not apply to lung cancer screening, patients with immunosuppression, o
r patients with known primary cancer.



Note  Newly detected indeterminate nodule in persons 35 years of age or older. Persons under the age 
of 35 should not receive follow-up unless there is a known primary cancer.



Low Risk Patient -- minimal or absent history of smoking and of other known risk factors.

High Risk Patient -- history of smoking or of other known risk factors.



Nodule dimensions are average of long and short axes, rounded to the nearest millimeter.



Based on 2017 Fleischner Society Guidelines found in Radiology 2017 284:228-243. https://doi.org/10.1
148/radiol.1492461912



CT of the abdomen and pelvis with contrast



FINDINGS: Gallbladder is been removed. No biliary tree dilation. The liver, spleen, pancreas and adre
nal glands show no definite abnormalities. Right kidney appears normal. Left renal low densities are 
probably cysts. No fluid or adenopathy in the upper abdomen.



CT of the pelvis shows evidence of hysterectomy. There is slight presacral edema of uncertain etiolog
y as the rectum and sigmoid colon show no definite abnormalities. No pelvic or inguinal adenopathy. N
o adnexal masses. No hernia or bowel obstruction. No diverticulosis or diverticulitis. Appendix is no
t seen.



IMPRESSION: No significant abnormality.





Automated exposure control was utilized to diminish radiation dose. 



Signer Name: Del Fink MD 

Signed: 1/20/2020 5:52 PM

 Workstation Name: [id number]

## 2020-01-20 NOTE — CAT SCAN REPORT
CT angiography of the chest with 2-D reconstructions



INDICATION: Chest pain with elevated d-dimer



Thin section axial images were obtained as well as 2-D reformatted MIP images in all 3 planes



FINDINGS: There is no hilar or mediastinal adenopathy. No pleural or pericardial effusion. Lung windo
ws show no masses or infiltrates. There is no thoracic aortic aneurysm or dissection present. Routine
 axial images as well as 2-D reconstructions through the pulmonary arteries show no evidence of embol
i. There is an incidental 6 mm right lower lobe nodule on image #262 of series #3.



IMPRESSION: No PTE or pneumonia. Incidental right lower lobe nodule.



INCIDENTAL PULMONARY NODULE RECOMMENDATIONS 



Solid Nodule size 6-8 mm -- Single

- Low Risk Patient: CT at 6-12 months, then consider CT at 18-24 months

- High Risk Patient: CT at 6-12 months, then CT at 18-24 months





Note  These recommendations do not apply to lung cancer screening, patients with immunosuppression, o
r patients with known primary cancer.



Note  Newly detected indeterminate nodule in persons 35 years of age or older. Persons under the age 
of 35 should not receive follow-up unless there is a known primary cancer.



Low Risk Patient -- minimal or absent history of smoking and of other known risk factors.

High Risk Patient -- history of smoking or of other known risk factors.



Nodule dimensions are average of long and short axes, rounded to the nearest millimeter.



Based on 2017 Fleischner [employer] Guidelines found in Radiology 2017 284:228-243. https://doi.org/10.1
148/radiol.0331595760



CT of the abdomen and pelvis with contrast



FINDINGS: Gallbladder is been removed. No biliary tree dilation. The liver, spleen, pancreas and adre
nal glands show no definite abnormalities. Right kidney appears normal. Left renal low densities are 
probably cysts. No fluid or adenopathy in the upper abdomen.



CT of the pelvis shows evidence of hysterectomy. There is slight presacral edema of uncertain etiolog
y as the rectum and sigmoid colon show no definite abnormalities. No pelvic or inguinal adenopathy. N
o adnexal masses. No hernia or bowel obstruction. No diverticulosis or diverticulitis. Appendix is no
t seen.



IMPRESSION: No significant abnormality.





Automated exposure control was utilized to diminish radiation dose. 



Signer Name: Del Fink MD 

Signed: 1/20/2020 5:52 PM

 Workstation Name: [id number]
